# Patient Record
Sex: MALE | Race: BLACK OR AFRICAN AMERICAN | Employment: FULL TIME | URBAN - METROPOLITAN AREA
[De-identification: names, ages, dates, MRNs, and addresses within clinical notes are randomized per-mention and may not be internally consistent; named-entity substitution may affect disease eponyms.]

---

## 2017-08-09 ENCOUNTER — HOSPITAL ENCOUNTER (EMERGENCY)
Age: 37
Discharge: LEFT BEFORE DISCHARGE | End: 2017-08-09
Attending: EMERGENCY MEDICINE
Payer: COMMERCIAL

## 2017-08-09 VITALS
TEMPERATURE: 99.4 F | RESPIRATION RATE: 16 BRPM | BODY MASS INDEX: 33.05 KG/M2 | WEIGHT: 244 LBS | HEIGHT: 72 IN | HEART RATE: 95 BPM | SYSTOLIC BLOOD PRESSURE: 138 MMHG | DIASTOLIC BLOOD PRESSURE: 80 MMHG | OXYGEN SATURATION: 98 %

## 2017-08-09 PROCEDURE — 75810000275 HC EMERGENCY DEPT VISIT NO LEVEL OF CARE

## 2017-08-09 RX ORDER — ACETAMINOPHEN AND CODEINE PHOSPHATE 300; 60 MG/1; MG/1
1 TABLET ORAL
COMMUNITY

## 2017-08-09 RX ORDER — CYCLOBENZAPRINE HCL 5 MG
5 TABLET ORAL
COMMUNITY

## 2017-08-10 NOTE — ED NOTES
Pt called to triage room, pt says \"wait can't you see I'm talking to my girlfriend\". I said sir I need to triage you, after triage pt taken immediately back to room 21.

## 2017-08-10 NOTE — ED NOTES
Pt states he is leaving and going to another hospital, pt was asked to return to his room and I would get the provider to look at his leg. Pt says no I am leaving as he yelled in the lobby. Pt was educated on the importance of seeking medical attention for his left leg swelling. Lyn Owens and David FANG notified.

## 2017-08-10 NOTE — ED NOTES
Pt asked could he go and get his girlfriend out of the lobby, pt's girlfriend then ask if pt was in trouble because of the type of room he was in, I asked pt if he would like to move, pt responded by saying \"no because I won't be here long\".

## 2018-04-30 ENCOUNTER — HOSPITAL ENCOUNTER (EMERGENCY)
Age: 38
Discharge: HOME | End: 2018-04-30
Attending: EMERGENCY MEDICINE
Payer: COMMERCIAL

## 2018-04-30 ENCOUNTER — APPOINTMENT (OUTPATIENT)
Dept: GENERAL RADIOLOGY | Age: 38
End: 2018-04-30
Attending: PHYSICIAN ASSISTANT
Payer: COMMERCIAL

## 2018-04-30 VITALS
DIASTOLIC BLOOD PRESSURE: 89 MMHG | SYSTOLIC BLOOD PRESSURE: 151 MMHG | HEIGHT: 73 IN | RESPIRATION RATE: 14 BRPM | BODY MASS INDEX: 31.81 KG/M2 | TEMPERATURE: 98.5 F | WEIGHT: 240 LBS | OXYGEN SATURATION: 100 % | HEART RATE: 68 BPM

## 2018-04-30 DIAGNOSIS — S82.891A AVULSION FRACTURE OF RIGHT ANKLE, CLOSED, INITIAL ENCOUNTER: Primary | ICD-10-CM

## 2018-04-30 DIAGNOSIS — M25.561 ACUTE PAIN OF RIGHT KNEE: ICD-10-CM

## 2018-04-30 PROCEDURE — 75810000053 HC SPLINT APPLICATION

## 2018-04-30 PROCEDURE — 73610 X-RAY EXAM OF ANKLE: CPT

## 2018-04-30 PROCEDURE — 74011250637 HC RX REV CODE- 250/637: Performed by: PHYSICIAN ASSISTANT

## 2018-04-30 PROCEDURE — 73562 X-RAY EXAM OF KNEE 3: CPT

## 2018-04-30 PROCEDURE — 99283 EMERGENCY DEPT VISIT LOW MDM: CPT

## 2018-04-30 RX ORDER — NAPROXEN 375 MG/1
375 TABLET ORAL 2 TIMES DAILY WITH MEALS
Qty: 20 TAB | Refills: 0 | Status: SHIPPED | OUTPATIENT
Start: 2018-04-30

## 2018-04-30 RX ORDER — HYDROCODONE BITARTRATE AND ACETAMINOPHEN 5; 325 MG/1; MG/1
1 TABLET ORAL
Status: COMPLETED | OUTPATIENT
Start: 2018-04-30 | End: 2018-04-30

## 2018-04-30 RX ORDER — HYDROCODONE BITARTRATE AND ACETAMINOPHEN 5; 325 MG/1; MG/1
1 TABLET ORAL
Qty: 12 TAB | Refills: 0 | Status: SHIPPED | OUTPATIENT
Start: 2018-04-30

## 2018-04-30 RX ADMIN — HYDROCODONE BITARTRATE AND ACETAMINOPHEN 1 TABLET: 5; 325 TABLET ORAL at 15:43

## 2018-04-30 NOTE — ED TRIAGE NOTES
Patient states he had a work injury with his ankle that kept him out of work x2 weeks. Yesterday when he woke up he noticed that his knee was really hurting him.  His knee is throbbing

## 2018-04-30 NOTE — ED PROVIDER NOTES
EMERGENCY DEPARTMENT HISTORY AND PHYSICAL EXAM    1:19 PM      Date: 4/30/2018  Patient Name: Kaia Brambila    History of Presenting Illness     No chief complaint on file. History Provided By: Patient    Chief Complaint: Knee pain  Duration: 1 Day  Timing:  Acute  Location: Right  Quality: Throbbing  Severity: 10 out of 10  Modifying Factors: No relief with Ibuprofen  Associated Symptoms: Knee swelling    Additional History (Context): Kaia Brambila is a 42 y.o. male who presents with right 10/10 throbbing knee pain, onset 1 day ago. Pt states he sprained his ankle at work ~2 weeks ago and was released to full duty at work by [employer] at work. But he woke up yesterday morning with the knee pain. Pt states he was completely fine 2 days ago. Associated sxs include right knee swelling. Pt is using crutches because he is unable to walk with that leg. He is still having some swelling in the right ankle depending on how much walking he does. No relief with Ibuprofen. No other symptoms or concerns were expressed. PCP: None    Current Facility-Administered Medications   Medication Dose Route Frequency Provider Last Rate Last Dose    HYDROcodone-acetaminophen (NORCO) 5-325 mg per tablet 1 Tab  1 Tab Oral NOW AMPARO Amaral         Current Outpatient Prescriptions   Medication Sig Dispense Refill    HYDROcodone-acetaminophen (NORCO) 5-325 mg per tablet Take 1 Tab by mouth every four (4) hours as needed for Pain. Max Daily Amount: 6 Tabs. 12 Tab 0    naproxen (NAPROSYN) 375 mg tablet Take 1 Tab by mouth two (2) times daily (with meals). 20 Tab 0    cyclobenzaprine (FLEXERIL) 5 mg tablet Take 5 mg by mouth three (3) times daily as needed for Muscle Spasm(s).  acetaminophen-codeine (TYLENOL-CODEINE #4) 300-60 mg per tablet Take 1 Tab by mouth every four (4) hours as needed for Pain. Past History     Past Medical History:  No past medical history on file.     Past Surgical History:  No past surgical history on file. Family History:  No family history on file. Social History:  Social History   Substance Use Topics    Smoking status: Current Every Day Smoker    Smokeless tobacco: Never Used    Alcohol use Yes      Comment: social       Allergies:  No Known Allergies      Review of Systems     Review of Systems   Musculoskeletal: Positive for arthralgias (right knee) and joint swelling (right knee). Negative for back pain. Skin: Negative for color change and wound. All other systems reviewed and are negative. Physical Exam     Visit Vitals    /89 (BP 1 Location: Left arm, BP Patient Position: At rest;Sitting)    Pulse 68    Temp 98.5 °F (36.9 °C)    Resp 14    Ht 6' 1\" (1.854 m)    Wt 108.9 kg (240 lb)    SpO2 100%    BMI 31.66 kg/m2         Physical Exam   Constitutional: He appears well-developed and well-nourished. No distress. HENT:   Head: Normocephalic and atraumatic. Right Ear: Hearing and external ear normal.   Left Ear: Hearing and external ear normal.   Nose: Nose normal.   Eyes: Conjunctivae are normal.   Neck: Normal range of motion. Neck supple. Musculoskeletal:        Right knee: He exhibits decreased range of motion and swelling. He exhibits no ecchymosis, no deformity, no erythema, normal alignment, normal patellar mobility and no bony tenderness. Tenderness found. Lateral joint line tenderness noted. Right ankle: He exhibits swelling (minimal ). He exhibits normal range of motion, no deformity, no laceration and normal pulse. No tenderness. Achilles tendon normal.   Right LE: popliteal, DP, PT pulses intact   Neurological: He is alert. Walks with crutches   Skin: Skin is warm and dry. He is not diaphoretic. Psychiatric: He has a normal mood and affect. Nursing note and vitals reviewed. Diagnostic Study Results     Labs -  No results found for this or any previous visit (from the past 12 hour(s)).     Radiologic Studies -   XR KNEE RT 3 V    (Results Pending)   XR ANKLE RT MIN 3 V    (Results Pending)         Medical Decision Making   I am the first provider for this patient. I reviewed the vital signs, available nursing notes, past medical history, past surgical history, family history and social history. Vital Signs-Reviewed the patient's vital signs. Pulse Oximetry Analysis -  100% on room air     Records Reviewed: Nursing Notes (Time of Review: 1:19 PM)    Provider Notes (Medical Decision Making): Pt c/o right knee pain x Saturday. Had ankle sprain 2 weeks ago which occurred at work, dx at [employer] and was cleared to return. Re-xrayed ankle here and noted avulsion fx x2 (which pt states he was told as well, but was not splinted). Knee w/o acute process. Will splint in ocl and have pt f/u with ortho. He has crutches. Recommend RICE at home, nsaids, norco for break through pain. AMPARO Amaral 3:21 PM        Diagnosis     Clinical Impression:   1. Avulsion fracture of right ankle, closed, initial encounter    2. Acute pain of right knee        Disposition: discharge    Follow-up Information     Follow up With Details Comments Teresa. MD Shai Cifuentes [phone] [phone]             Patient's Medications   Start Taking    HYDROCODONE-ACETAMINOPHEN (NORCO) 5-325 MG PER TABLET    Take 1 Tab by mouth every four (4) hours as needed for Pain. Max Daily Amount: 6 Tabs. NAPROXEN (NAPROSYN) 375 MG TABLET    Take 1 Tab by mouth two (2) times daily (with meals). Continue Taking    ACETAMINOPHEN-CODEINE (TYLENOL-CODEINE #4) 300-60 MG PER TABLET    Take 1 Tab by mouth every four (4) hours as needed for Pain. CYCLOBENZAPRINE (FLEXERIL) 5 MG TABLET    Take 5 mg by mouth three (3) times daily as needed for Muscle Spasm(s).    These Medications have changed    No medications on file   Stop Taking    No medications on file _______________________________    Attestations:  AMPARO Giraldo acting as a scribe for and in the presence of Kojo Ferguson PA-C     April 30, 2018 at [facility] PM       Provider Attestation:      I personally performed the services described in the documentation, reviewed the documentation, as recorded by the scribe in my presence, and it accurately and completely records my words and actions.  April 30, 2018 at 3:21 PM - Kojo Ferguson PA-C     _______________________________

## 2018-04-30 NOTE — LETTER
[address] [place] [address] Dr [facility] EMERGENCY DEPT 
[address] [facility] [address] 
[phone] Work/School Note Date: 4/30/2018 To Whom It May concern: 
 
Erin Hess was seen and treated today in the emergency room by the following provider(s): 
Attending Provider: Cassy Dubon MD 
Physician Assistant: AMPARO Veronica. Erin Hess may not return to work until cleared by orthopedist.  
 
Sincerely, AMPARO Veronica

## 2018-06-26 ENCOUNTER — APPOINTMENT (OUTPATIENT)
Dept: CT IMAGING | Age: 38
End: 2018-06-26
Attending: EMERGENCY MEDICINE
Payer: COMMERCIAL

## 2018-06-26 ENCOUNTER — HOSPITAL ENCOUNTER (EMERGENCY)
Age: 38
Discharge: HOME | End: 2018-06-27
Attending: EMERGENCY MEDICINE
Payer: COMMERCIAL

## 2018-06-26 DIAGNOSIS — M54.16 LUMBAR RADICULOPATHY, RIGHT: ICD-10-CM

## 2018-06-26 DIAGNOSIS — R03.0 ELEVATED BLOOD PRESSURE READING: ICD-10-CM

## 2018-06-26 DIAGNOSIS — M54.41 ACUTE RIGHT-SIDED LOW BACK PAIN WITH RIGHT-SIDED SCIATICA: Primary | ICD-10-CM

## 2018-06-26 PROCEDURE — 74011250637 HC RX REV CODE- 250/637: Performed by: EMERGENCY MEDICINE

## 2018-06-26 PROCEDURE — 72131 CT LUMBAR SPINE W/O DYE: CPT

## 2018-06-26 PROCEDURE — 99285 EMERGENCY DEPT VISIT HI MDM: CPT

## 2018-06-26 RX ORDER — OXYCODONE AND ACETAMINOPHEN 5; 325 MG/1; MG/1
1 TABLET ORAL
Status: COMPLETED | OUTPATIENT
Start: 2018-06-26 | End: 2018-06-26

## 2018-06-26 RX ORDER — DIAZEPAM 5 MG/1
5 TABLET ORAL
Status: COMPLETED | OUTPATIENT
Start: 2018-06-26 | End: 2018-06-26

## 2018-06-26 RX ORDER — IBUPROFEN 400 MG/1
800 TABLET ORAL
Status: DISCONTINUED | OUTPATIENT
Start: 2018-06-26 | End: 2018-06-27 | Stop reason: HOSPADM

## 2018-06-26 RX ADMIN — OXYCODONE HYDROCHLORIDE AND ACETAMINOPHEN 1 TABLET: 5; 325 TABLET ORAL at 23:56

## 2018-06-26 RX ADMIN — DIAZEPAM 5 MG: 5 TABLET ORAL at 23:56

## 2018-06-27 ENCOUNTER — APPOINTMENT (OUTPATIENT)
Dept: MRI IMAGING | Age: 38
End: 2018-06-27
Attending: EMERGENCY MEDICINE
Payer: COMMERCIAL

## 2018-06-27 VITALS
OXYGEN SATURATION: 97 % | RESPIRATION RATE: 18 BRPM | WEIGHT: 235 LBS | HEART RATE: 79 BPM | HEIGHT: 73 IN | BODY MASS INDEX: 31.14 KG/M2 | DIASTOLIC BLOOD PRESSURE: 83 MMHG | SYSTOLIC BLOOD PRESSURE: 132 MMHG | TEMPERATURE: 98.4 F

## 2018-06-27 LAB
ANION GAP SERPL CALC-SCNC: 5 MMOL/L (ref 3–18)
APTT PPP: 29.4 SEC (ref 23–36.4)
BASOPHILS # BLD: 0 K/UL (ref 0–0.1)
BASOPHILS NFR BLD: 0 % (ref 0–2)
BUN SERPL-MCNC: 13 MG/DL (ref 7–18)
BUN/CREAT SERPL: 10 (ref 12–20)
CALCIUM SERPL-MCNC: 9.1 MG/DL (ref 8.5–10.1)
CHLORIDE SERPL-SCNC: 105 MMOL/L (ref 100–108)
CO2 SERPL-SCNC: 29 MMOL/L (ref 21–32)
CREAT SERPL-MCNC: 1.32 MG/DL (ref 0.6–1.3)
DIFFERENTIAL METHOD BLD: ABNORMAL
EOSINOPHIL # BLD: 0.6 K/UL (ref 0–0.4)
EOSINOPHIL NFR BLD: 9 % (ref 0–5)
ERYTHROCYTE [DISTWIDTH] IN BLOOD BY AUTOMATED COUNT: 14.1 % (ref 11.6–14.5)
GLUCOSE SERPL-MCNC: 103 MG/DL (ref 74–99)
HCT VFR BLD AUTO: 47.6 % (ref 36–48)
HGB BLD-MCNC: 17 G/DL (ref 13–16)
INR PPP: 0.9 (ref 0.8–1.2)
LYMPHOCYTES # BLD: 2.3 K/UL (ref 0.9–3.6)
LYMPHOCYTES NFR BLD: 35 % (ref 21–52)
MCH RBC QN AUTO: 31.7 PG (ref 24–34)
MCHC RBC AUTO-ENTMCNC: 35.7 G/DL (ref 31–37)
MCV RBC AUTO: 88.6 FL (ref 74–97)
MONOCYTES # BLD: 0.4 K/UL (ref 0.05–1.2)
MONOCYTES NFR BLD: 7 % (ref 3–10)
NEUTS SEG # BLD: 3.2 K/UL (ref 1.8–8)
NEUTS SEG NFR BLD: 49 % (ref 40–73)
PLATELET # BLD AUTO: 139 K/UL (ref 135–420)
PMV BLD AUTO: 11.8 FL (ref 9.2–11.8)
POTASSIUM SERPL-SCNC: 3.8 MMOL/L (ref 3.5–5.5)
PROTHROMBIN TIME: 12.1 SEC (ref 11.5–15.2)
RBC # BLD AUTO: 5.37 M/UL (ref 4.7–5.5)
SODIUM SERPL-SCNC: 139 MMOL/L (ref 136–145)
WBC # BLD AUTO: 6.5 K/UL (ref 4.6–13.2)

## 2018-06-27 PROCEDURE — 74011250637 HC RX REV CODE- 250/637: Performed by: EMERGENCY MEDICINE

## 2018-06-27 PROCEDURE — 80048 BASIC METABOLIC PNL TOTAL CA: CPT | Performed by: EMERGENCY MEDICINE

## 2018-06-27 PROCEDURE — 85730 THROMBOPLASTIN TIME PARTIAL: CPT | Performed by: EMERGENCY MEDICINE

## 2018-06-27 PROCEDURE — 93005 ELECTROCARDIOGRAM TRACING: CPT

## 2018-06-27 PROCEDURE — 96374 THER/PROPH/DIAG INJ IV PUSH: CPT

## 2018-06-27 PROCEDURE — 74011250636 HC RX REV CODE- 250/636: Performed by: EMERGENCY MEDICINE

## 2018-06-27 PROCEDURE — 85025 COMPLETE CBC W/AUTO DIFF WBC: CPT | Performed by: EMERGENCY MEDICINE

## 2018-06-27 PROCEDURE — 72158 MRI LUMBAR SPINE W/O & W/DYE: CPT

## 2018-06-27 PROCEDURE — 85610 PROTHROMBIN TIME: CPT | Performed by: EMERGENCY MEDICINE

## 2018-06-27 PROCEDURE — A9577 INJ MULTIHANCE: HCPCS | Performed by: EMERGENCY MEDICINE

## 2018-06-27 RX ORDER — DEXAMETHASONE SODIUM PHOSPHATE 4 MG/ML
10 INJECTION, SOLUTION INTRA-ARTICULAR; INTRALESIONAL; INTRAMUSCULAR; INTRAVENOUS; SOFT TISSUE
Status: COMPLETED | OUTPATIENT
Start: 2018-06-27 | End: 2018-06-27

## 2018-06-27 RX ORDER — OXYCODONE AND ACETAMINOPHEN 5; 325 MG/1; MG/1
1 TABLET ORAL
Status: COMPLETED | OUTPATIENT
Start: 2018-06-27 | End: 2018-06-27

## 2018-06-27 RX ORDER — TRAMADOL HYDROCHLORIDE 50 MG/1
50 TABLET ORAL
Qty: 14 TAB | Refills: 0 | Status: SHIPPED | OUTPATIENT
Start: 2018-06-27

## 2018-06-27 RX ORDER — DEXAMETHASONE SODIUM PHOSPHATE 4 MG/ML
10 INJECTION, SOLUTION INTRA-ARTICULAR; INTRALESIONAL; INTRAMUSCULAR; INTRAVENOUS; SOFT TISSUE
Status: DISCONTINUED | OUTPATIENT
Start: 2018-06-27 | End: 2018-06-27

## 2018-06-27 RX ORDER — DIAZEPAM 5 MG/1
5 TABLET ORAL
Status: COMPLETED | OUTPATIENT
Start: 2018-06-27 | End: 2018-06-27

## 2018-06-27 RX ORDER — DEXAMETHASONE 2 MG/1
TABLET ORAL
Qty: 10 TAB | Refills: 0 | Status: SHIPPED | OUTPATIENT
Start: 2018-06-27

## 2018-06-27 RX ORDER — METHYLPREDNISOLONE 4 MG/1
TABLET ORAL
Qty: 1 DOSE PACK | Refills: 0 | Status: SHIPPED | OUTPATIENT
Start: 2018-06-27 | End: 2018-07-09

## 2018-06-27 RX ADMIN — GADOBENATE DIMEGLUMINE 10 ML: 529 INJECTION, SOLUTION INTRAVENOUS at 05:59

## 2018-06-27 RX ADMIN — OXYCODONE HYDROCHLORIDE AND ACETAMINOPHEN 1 TABLET: 5; 325 TABLET ORAL at 04:49

## 2018-06-27 RX ADMIN — OXYCODONE HYDROCHLORIDE AND ACETAMINOPHEN 1 TABLET: 5; 325 TABLET ORAL at 05:50

## 2018-06-27 RX ADMIN — GADOBENATE DIMEGLUMINE 10 ML: 529 INJECTION, SOLUTION INTRAVENOUS at 06:00

## 2018-06-27 RX ADMIN — DIAZEPAM 5 MG: 5 TABLET ORAL at 04:49

## 2018-06-27 RX ADMIN — DEXAMETHASONE SODIUM PHOSPHATE 10 MG: 4 INJECTION, SOLUTION INTRAMUSCULAR; INTRAVENOUS at 02:41

## 2018-06-27 NOTE — ED NOTES
4:21 AM: Patient care assumed from [address], ED provider. Patient complaint(s), current treatment plan, progression, and available diagnostic results have been discussed thoroughly. Rounding occurred: yes  Intended Disposition: TBD   Pending diagnostic reports and/or labs (please list): MRI L-Spine, Recommendations per Dr. Acuna Kos:  Recent Results (from the past 12 hour(s))   EKG, 12 LEAD, INITIAL    Collection Time: 06/27/18  2:21 AM   Result Value Ref Range    Ventricular Rate 64 BPM    Atrial Rate 64 BPM    P-R Interval 164 ms    QRS Duration 86 ms    Q-T Interval 418 ms    QTC Calculation (Bezet) 431 ms    Calculated P Axis 66 degrees    Calculated R Axis 42 degrees    Calculated T Axis 19 degrees    Diagnosis       Normal sinus rhythm  Possible Left atrial enlargement  Borderline ECG  No previous ECGs available     CBC WITH AUTOMATED DIFF    Collection Time: 06/27/18  2:30 AM   Result Value Ref Range    WBC 6.5 4.6 - 13.2 K/uL    RBC 5.37 4.70 - 5.50 M/uL    HGB 17.0 (H) 13.0 - 16.0 g/dL    HCT 47.6 36.0 - 48.0 %    MCV 88.6 74.0 - 97.0 FL    MCH 31.7 24.0 - 34.0 PG    MCHC 35.7 31.0 - 37.0 g/dL    RDW 14.1 11.6 - 14.5 %    PLATELET 149 116 - 162 K/uL    MPV 11.8 9.2 - 11.8 FL    NEUTROPHILS 49 40 - 73 %    LYMPHOCYTES 35 21 - 52 %    MONOCYTES 7 3 - 10 %    EOSINOPHILS 9 (H) 0 - 5 %    BASOPHILS 0 0 - 2 %    ABS. NEUTROPHILS 3.2 1.8 - 8.0 K/UL    ABS. LYMPHOCYTES 2.3 0.9 - 3.6 K/UL    ABS. MONOCYTES 0.4 0.05 - 1.2 K/UL    ABS. EOSINOPHILS 0.6 (H) 0.0 - 0.4 K/UL    ABS.  BASOPHILS 0.0 0.0 - 0.1 K/UL    DF AUTOMATED     METABOLIC PANEL, BASIC    Collection Time: 06/27/18  2:30 AM   Result Value Ref Range    Sodium 139 136 - 145 mmol/L    Potassium 3.8 3.5 - 5.5 mmol/L    Chloride 105 100 - 108 mmol/L    CO2 29 21 - 32 mmol/L    Anion gap 5 3.0 - 18 mmol/L    Glucose 103 (H) 74 - 99 mg/dL    BUN 13 7.0 - 18 MG/DL    Creatinine 1.32 (H) 0.6 - 1.3 MG/DL    BUN/Creatinine ratio 10 (L) 12 - 20      GFR est AA >60 >60 ml/min/1.73m2    GFR est non-AA >60 >60 ml/min/1.73m2    Calcium 9.1 8.5 - 10.1 MG/DL   PTT    Collection Time: 06/27/18  2:30 AM   Result Value Ref Range    aPTT 29.4 23.0 - 36.4 SEC   PROTHROMBIN TIME + INR    Collection Time: 06/27/18  2:30 AM   Result Value Ref Range    Prothrombin time 12.1 11.5 - 15.2 sec    INR 0.9 0.8 - 1.2         Imaging:  MRI LUMB SPINE W WO CONT   Final Result      CT SPINE LUMB WO CONT   Final Result        Radiologist's interpretation of MRI L-Spine (Read by Dr. Kaitlynn Maddox):  1. Disc extrusion at L4-5. The extruded disc is in position to contact and  displace the emerging right L5 nerve root. 2.  No abnormal enhancement. Disposition:   6:57 AM : Pt care transferred to Dr. Geovany Stanley, ED provider. History of patient complaint(s), available diagnostic reports and current treatment plan has been discussed thoroughly. Bedside rounding on patient occured : yes . Intended disposition of patient : TBD  Pending diagnostics reports and/or labs (please list): Recommendations per Dr. Yuko Lowry up With Details Comments Dinorah Alvares MD Schedule an appointment as soon as possible for a visit today  [facility] [address]  [address] [place] [address] [facility] EMERGENCY DEPT  If symptoms worsen return immediately [address] [place] [id number]  [phone]           Patient's Medications   Start Taking    DEXAMETHASONE (DECADRON) 2 MG TABLET    Take one tablet twice daily x 5 days   Continue Taking    ACETAMINOPHEN-CODEINE (TYLENOL-CODEINE #4) 300-60 MG PER TABLET    Take 1 Tab by mouth every four (4) hours as needed for Pain. CYCLOBENZAPRINE (FLEXERIL) 5 MG TABLET    Take 5 mg by mouth three (3) times daily as needed for Muscle Spasm(s). HYDROCODONE-ACETAMINOPHEN (NORCO) 5-325 MG PER TABLET    Take 1 Tab by mouth every four (4) hours as needed for Pain. Max Daily Amount: 6 Tabs.     NAPROXEN (NAPROSYN) 375 MG TABLET    Take 1 Tab by mouth two (2) times daily (with meals). These Medications have changed    No medications on file   Stop Taking    No medications on file         Scribe Attestation:     Janessa De La Cruz, acting as a scribe for and in the presence of Andrea Gomez DO      June 27, 2018 at 4:21 AM       Provider Attestation:      I personally performed the services described in the documentation, reviewed the documentation, as recorded by the scribe in my presence, and it accurately and completely records my words and actions.  June 27, 2018 at DO Deshawn

## 2018-06-27 NOTE — ED TRIAGE NOTES
Patient arrived to the ED complaining of back pain that is radiating down the leg. Patient states he was given gabapentin and prednisone for the back pain 2 weeks ago, but it has not improved. Patient states he has taken motrin, vicodin, and gabapentin this evening without relief. Patient states that he is having difficulty standing. Patient states he recently had an ankle injury at work and states he thinks he may have tweaked his back at therapy for his ankle.

## 2018-06-27 NOTE — DISCHARGE INSTRUCTIONS
Elevated Blood Pressure: Care Instructions  Your Care Instructions    Blood pressure is a measure of how hard the blood pushes against the walls of your arteries. It's normal for blood pressure to go up and down throughout the day. But if it stays up over time, you have high blood pressure. Two numbers tell you your blood pressure. The first number is the systolic pressure. It shows how hard the blood pushes when your heart is pumping. The second number is the diastolic pressure. It shows how hard the blood pushes between heartbeats, when your heart is relaxed and filling with blood. An ideal blood pressure in adults is less than 120/80 (say \"120 over 80\"). High blood pressure is 140/90 or higher. You have high blood pressure if your top number is 140 or higher or your bottom number is 90 or higher, or both. The main test for high blood pressure is simple, fast, and painless. To diagnose high blood pressure, your doctor will test your blood pressure at different times. After testing your blood pressure, your doctor may ask you to test it again when you are home. If you are diagnosed with high blood pressure, you can work with your doctor to make a long-term plan to manage it. Follow-up care is a key part of your treatment and safety. Be sure to make and go to all appointments, and call your doctor if you are having problems. It's also a good idea to know your test results and keep a list of the medicines you take. How can you care for yourself at home? · Do not smoke. Smoking increases your risk for heart attack and stroke. If you need help quitting, talk to your doctor about stop-smoking programs and medicines. These can increase your chances of quitting for good. · Stay at a healthy weight. · Try to limit how much sodium you eat to less than 2,300 milligrams (mg) a day. Your doctor may ask you to try to eat less than 1,500 mg a day. · Be physically active.  Get at least 30 minutes of exercise on most days of the week. Walking is a good choice. You also may want to do other activities, such as running, swimming, cycling, or playing tennis or team sports. · Avoid or limit alcohol. Talk to your doctor about whether you can drink any alcohol. · Eat plenty of fruits, vegetables, and low-fat dairy products. Eat less saturated and total fats. · Learn how to check your blood pressure at home. When should you call for help? Call your doctor now or seek immediate medical care if:  ? · Your blood pressure is much higher than normal (such as 180/110 or higher). ? · You think high blood pressure is causing symptoms such as:  ¨ Severe headache. ¨ Blurry vision. ? Watch closely for changes in your health, and be sure to contact your doctor if:  ? · You do not get better as expected. Where can you learn more? Go to http://[employer] Labsm[employer]te.info/. Enter N820 in the search box to learn more about \"Elevated Blood Pressure: Care Instructions. \"  Current as of: September 21, 2016  Content Version: 11.4  © 1678-2293 [employer]. Care instructions adapted under license by [employer] (which disclaims liability or warranty for this information). If you have questions about a medical condition or this instruction, always ask your healthcare professional. [address] any warranty or liability for your use of this information. Learning About Relief for Back Pain  What is back tension and strain? Back strain happens when you overstretch, or pull, a muscle in your back. You may hurt your back in an accident or when you exercise or lift something. Most back pain will get better with rest and time. You can take care of yourself at home to help your back heal.  What can you do first to relieve back pain? When you first feel back pain, try these steps:  · Walk.  Take a short walk (10 to 20 minutes) on a level surface (no slopes, hills, or stairs) every 2 to 3 hours. Walk only distances you can manage without pain, especially leg pain. · Relax. Find a comfortable position for rest. Some people are comfortable on the floor or a medium-firm bed with a small pillow under their head and another under their knees. Some people prefer to lie on their side with a pillow between their knees. Don't stay in one position for too long. · Try heat or ice. Try using a heating pad on a low or medium setting, or take a warm shower, for 15 to 20 minutes every 2 to 3 hours. Or you can buy single-use heat wraps that last up to 8 hours. You can also try an ice pack for 10 to 15 minutes every 2 to 3 hours. You can use an ice pack or a bag of frozen vegetables wrapped in a thin towel. There is not strong evidence that either heat or ice will help, but you can try them to see if they help. You may also want to try switching between heat and cold. · Take pain medicine exactly as directed. ¨ If the doctor gave you a prescription medicine for pain, take it as prescribed. ¨ If you are not taking a prescription pain medicine, ask your doctor if you can take an over-the-counter medicine. What else can you do? · Stretch and exercise. Exercises that increase flexibility may relieve your pain and make it easier for your muscles to keep your spine in a good, neutral position. And don't forget to keep walking. · Do self-massage. You can use self-massage to unwind after work or school or to energize yourself in the morning. You can easily massage your feet, hands, or neck. Self-massage works best if you are in comfortable clothes and are sitting or lying in a comfortable position. Use oil or lotion to massage bare skin. · Reduce stress. Back pain can lead to a vicious [facility]: Distress about the pain tenses the muscles in your back, which in turn causes more pain. Learn how to relax your mind and your muscles to lower your stress. Where can you learn more?   Go to http://renan-mayte.info/. Enter C515 in the search box to learn more about \"Learning About Relief for Back Pain. \"  Current as of: March 21, 2017  Content Version: 11.5  © 5152-3122 [employer]. Care instructions adapted under license by [employer] (which disclaims liability or warranty for this information). If you have questions about a medical condition or this instruction, always ask your healthcare professional. [place] [address] any warranty or liability for your use of this information.

## 2018-06-27 NOTE — ED PROVIDER NOTES
EMERGENCY DEPARTMENT HISTORY AND PHYSICAL EXAM    Date: 6/26/2018  Patient Name: Lord Brooks    History of Presenting Illness     Chief Complaint   Patient presents with    Back Pain         History Provided By: Patient and maximus    Chief Complaint: back pain  Duration: several Months  Timing:  Acute, Constant and Worsening  Location: right  Quality: Aching and Burning  Severity: Moderate  Modifying Factors: fall -- went to PT for ankle fx and had back pain then, too  Associated Symptoms: denies any other associated signs or symptoms      Additional History (Context): Lord Brooks is a 42 y.o. male with No significant past medical history who presents with low back pain radiating to right leg; has had pain for a while now. 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 During PT for the ankle, he also had low back pain. returned to work on 6/7/18. Went to [facility] for his back (separate from the ortho group treating his ankle) and told by provider his insurance wouldn't cover the MRI until two weeks of PT completed first.  Pt hasn't had the PT yet but is to start on 6/26/18. On prednisone, gabapentin from ortho; mom has had old Vicodin and is taking those. Denies saddle anesthesia, bowel incontinence, fever, rash, additional trauma, unintentional weight loss in the past six months, rectal pain, IVD use. PCP: None    Current Facility-Administered Medications   Medication Dose Route Frequency Provider Last Rate Last Dose    ibuprofen (MOTRIN) tablet 800 mg  800 mg Oral NOW Giovanni Enriques, PA   Stopped at 06/26/18 3247     Current Outpatient Prescriptions   Medication Sig Dispense Refill    dexamethasone (DECADRON) 2 mg tablet Take one tablet twice daily x 5 days 10 Tab 0    HYDROcodone-acetaminophen (NORCO) 5-325 mg per tablet Take 1 Tab by mouth every four (4) hours as needed for Pain.  Max Daily Amount: 6 Tabs. 12 Tab 0    naproxen (NAPROSYN) 375 mg tablet Take 1 Tab by mouth two (2) times daily (with meals). 20 Tab 0    cyclobenzaprine (FLEXERIL) 5 mg tablet Take 5 mg by mouth three (3) times daily as needed for Muscle Spasm(s).  acetaminophen-codeine (TYLENOL-CODEINE #4) 300-60 mg per tablet Take 1 Tab by mouth every four (4) hours as needed for Pain. Past History     Past Medical History:  History reviewed. No pertinent past medical history. Past Surgical History:  History reviewed. No pertinent surgical history. Family History:  History reviewed. No pertinent family history. Social History:  Social History   Substance Use Topics    Smoking status: Current Every Day Smoker    Smokeless tobacco: Never Used    Alcohol use Yes      Comment: social       Allergies:  No Known Allergies      Review of Systems   Review of Systems   Constitutional: Negative for fever. Musculoskeletal: Positive for back pain. Skin: Negative for rash and wound. Neurological: Negative for weakness and numbness. All other systems reviewed and are negative. All Other Systems Negative  Physical Exam     Vitals:    06/26/18 2251 06/27/18 0015   BP: 134/86 (!) 158/92   Pulse: 79    Resp: 18    Temp: 98.4 °F (36.9 °C)    SpO2: 99% 99%     Physical Exam   Constitutional: Vital signs are normal. He appears well-developed and well-nourished. He is active. Non-toxic appearance. He does not appear ill. No distress. HENT:   Head: Normocephalic and atraumatic. Neck: Normal range of motion. Neck supple. Carotid bruit is not present. No tracheal deviation present. No thyromegaly present. Cardiovascular: Normal rate, regular rhythm and normal heart sounds. Exam reveals no gallop and no friction rub. No murmur heard. Pulmonary/Chest: Effort normal and breath sounds normal. No stridor. No respiratory distress. He has no wheezes. He has no rales. He exhibits no tenderness. Abdominal: Soft.  He exhibits no distension and no mass. There is no tenderness. There is no rebound, no guarding and no CVA tenderness. Musculoskeletal: Normal range of motion. He exhibits tenderness. Right SI jt TTP; +SLE and ETHEL. Neurological: He is alert. Skin: Skin is warm, dry and intact. He is not diaphoretic. No pallor. Psychiatric: He has a normal mood and affect. His speech is normal and behavior is normal. Judgment and thought content normal.   Nursing note and vitals reviewed. Diagnostic Study Results     Labs -     Recent Results (from the past 12 hour(s))   EKG, 12 LEAD, INITIAL    Collection Time: 06/27/18  2:21 AM   Result Value Ref Range    Ventricular Rate 64 BPM    Atrial Rate 64 BPM    P-R Interval 164 ms    QRS Duration 86 ms    Q-T Interval 418 ms    QTC Calculation (Bezet) 431 ms    Calculated P Axis 66 degrees    Calculated R Axis 42 degrees    Calculated T Axis 19 degrees    Diagnosis       Normal sinus rhythm  Possible Left atrial enlargement  Borderline ECG  No previous ECGs available     CBC WITH AUTOMATED DIFF    Collection Time: 06/27/18  2:30 AM   Result Value Ref Range    WBC 6.5 4.6 - 13.2 K/uL    RBC 5.37 4.70 - 5.50 M/uL    HGB 17.0 (H) 13.0 - 16.0 g/dL    HCT 47.6 36.0 - 48.0 %    MCV 88.6 74.0 - 97.0 FL    MCH 31.7 24.0 - 34.0 PG    MCHC 35.7 31.0 - 37.0 g/dL    RDW 14.1 11.6 - 14.5 %    PLATELET 724 028 - 405 K/uL    MPV 11.8 9.2 - 11.8 FL    NEUTROPHILS 49 40 - 73 %    LYMPHOCYTES 35 21 - 52 %    MONOCYTES 7 3 - 10 %    EOSINOPHILS 9 (H) 0 - 5 %    BASOPHILS 0 0 - 2 %    ABS. NEUTROPHILS 3.2 1.8 - 8.0 K/UL    ABS. LYMPHOCYTES 2.3 0.9 - 3.6 K/UL    ABS. MONOCYTES 0.4 0.05 - 1.2 K/UL    ABS. EOSINOPHILS 0.6 (H) 0.0 - 0.4 K/UL    ABS.  BASOPHILS 0.0 0.0 - 0.1 K/UL    DF AUTOMATED     METABOLIC PANEL, BASIC    Collection Time: 06/27/18  2:30 AM   Result Value Ref Range    Sodium 139 136 - 145 mmol/L    Potassium 3.8 3.5 - 5.5 mmol/L    Chloride 105 100 - 108 mmol/L    CO2 29 21 - 32 mmol/L Anion gap 5 3.0 - 18 mmol/L    Glucose 103 (H) 74 - 99 mg/dL    BUN 13 7.0 - 18 MG/DL    Creatinine 1.32 (H) 0.6 - 1.3 MG/DL    BUN/Creatinine ratio 10 (L) 12 - 20      GFR est AA >60 >60 ml/min/1.73m2    GFR est non-AA >60 >60 ml/min/1.73m2    Calcium 9.1 8.5 - 10.1 MG/DL   PTT    Collection Time: 06/27/18  2:30 AM   Result Value Ref Range    aPTT 29.4 23.0 - 36.4 SEC   PROTHROMBIN TIME + INR    Collection Time: 06/27/18  2:30 AM   Result Value Ref Range    Prothrombin time 12.1 11.5 - 15.2 sec    INR 0.9 0.8 - 1.2         Radiologic Studies -   CT SPINE LUMB WO CONT   Final Result      MRI LUMB SPINE W WO CONT    (Results Pending)     CT Results  (Last 48 hours)               06/27/18 0108  CT SPINE LUMB WO CONT Final result    Impression:  IMPRESSION:       Right paramedian disc extrusion at L4-5 resulting in central canal narrowing and   probable contact/ displacement of the emerging right L5 nerve root. Narrative:  PROCEDURE:  CT Lumbar Spine without Contrast.       INDICATION:  Back pain radiating down the right leg. Recent twisting of ankle   which may have exacerbated pre-existing back pain. Medical treatment with   Motrin, Vicodin, gabapentin and prednisone without relief. COMPARISON:  None. TECHNIQUE:  Helical volumetric CT imaging of the lumbar spine is performed   without intravenous or intrathecal contrast.  From this volumetric dataset,   multiplanar reconstruction images are generated. - Radiation dose:  1228 mGy-cm.   - Note:  Radiation dose optimization techniques are utilized as appropriate to   the exam, with combination of automated exposure control, adjustment of the mA   and/or kV according to patient's size (Including appropriate matching for   site-specific examinations), or use of iterative reconstruction technique. FINDINGS:       On a technical note, current scan is performed with the patient lying on the   right side due to the patient's discomfort. No evidence of acute fracture is detected. The normally lordotic curvature of   the lumbar spine is replaced with slightly kyphotic curvature, presumably   related to the patient's positioning. There is minimal retrolisthesis of L5   with respect to S1 by about 4 mm. Again this may be exacerbated by the patient   positioning. L1-2, L2-3:  Unremarkable. L3-4:  Minimal disc bulge. Mild ligamentum flavum hypertrophy. No definite   facet hypertrophy. No definite central canal narrowing or foraminal narrowing. L4-5:  Right paramedian disc extrusion (sagittal #30, axial #73). The extruded   disc measures about 1.6 cm in craniocaudal length, 1.2 cm across the base and   0.8 cm in thickness. The extruded disc may be in contact with and displace the   emerging right L5 nerve root. Central canal narrowing secondary to the bulk of   the extruded disc. No evidence for significant foraminal narrowing on either   side. No definite facet arthropathy. Mild ligamentum flavum hypertrophy. L5-S1:  Moderate disc bulge. No central canal narrowing. No significant   foraminal narrowing. Mild facet hypertrophy. Mild ligamentum flavum   hypertrophy. CXR Results  (Last 48 hours)    None            Medical Decision Making   I am the first provider for this patient. I reviewed the vital signs, available nursing notes, past medical history, past surgical history, family history and social history. Vital Signs-Reviewed the patient's vital signs. Records Reviewed: Nursing Notes, Old Medical Records and Previous Radiology Studies    Procedures:  EKG  Date/Time: 6/27/2018 4:09 AM  Performed by: Rodrigo Childs  Authorized by: Rodrigo Childs           Provider Notes (Medical Decision Making): spoke with Dr. Rubio De La Garza re: CT results. Was discussed w/ED attending, will obtain MRI now, hold in ED until evaluated and examined by Rubio De La Garza this morning.   Pt is a good candidate for close outpatient f/up as he's already demonstrated 8 weeks of PT.  Pre-op labs obtained. Pending MRI results and pt's pain control later this morning, pt also could be admitted for surgery either today or tomorrow. 4:08 AM : Pt care transferred to Dr. Nader Mccrary  ,ED provider. History of patient complaint(s), available diagnostic reports and current treatment plan has been discussed thoroughly. Bedside rounding on patient occured : no . Intended disposition of patient : TBD  Pending diagnostics reports and/or labs (please list): MRI, labs      MED RECONCILIATION:  Current Facility-Administered Medications   Medication Dose Route Frequency    ibuprofen (MOTRIN) tablet 800 mg  800 mg Oral NOW     Current Outpatient Prescriptions   Medication Sig    dexamethasone (DECADRON) 2 mg tablet Take one tablet twice daily x 5 days    HYDROcodone-acetaminophen (NORCO) 5-325 mg per tablet Take 1 Tab by mouth every four (4) hours as needed for Pain. Max Daily Amount: 6 Tabs.  naproxen (NAPROSYN) 375 mg tablet Take 1 Tab by mouth two (2) times daily (with meals).  cyclobenzaprine (FLEXERIL) 5 mg tablet Take 5 mg by mouth three (3) times daily as needed for Muscle Spasm(s).  acetaminophen-codeine (TYLENOL-CODEINE #4) 300-60 mg per tablet Take 1 Tab by mouth every four (4) hours as needed for Pain. Disposition:  TBD      Follow-up Information     Follow up With Details Comments Contact Juaquin Ch MD Schedule an appointment as soon as possible for a visit today  David Parra [address]  [address] [facility] EM[facility]ENC[facility] DEPT  If symptoms worsen return immediately [address]  [phone]          Current Discharge Medication List      START taking these medications    Details   dexamethasone (DECADRON) 2 mg tablet Take one tablet twice daily x 5 days  Qty: 10 Tab, Refills: 0                 Diagnosis     Clinical Impression:   1.  Acute right-sided low back pain with right-sided sciatica    2.  Lumbar radiculopathy, right    3. Elevated blood pressure reading

## 2018-06-27 NOTE — ED NOTES
Pt in ED sitting in wheelchair, pt reports right knee pain and back pain, pt reports 8 weeks of workmans compensation and physical therapy pt states went back to work with new ankle pain pt reports taking gabapentin, prednisone with no relief.

## 2018-06-27 NOTE — ED NOTES
8:11 AM Patient was evaluated by Dr. Thaddeus Edge at bedside. He reviewed the images and reports these are chronic and the patient follows an ortho/spine physician in [place], Dr. Ede Leslie. Dr. Thaddeus Edge recommends increases the gabapentin, a Medrol dose pack and follow up with his orthopedist or with Dr. Thaddeus Edge for outpatient. Scribe Attestation     [facility] acting as a scribe for and in the presence of Dr. Colton Tripp MD      June 27, 2018 at 8:16 AM       Provider Attestation:      I personally performed the services described in the documentation, reviewed the documentation, as recorded by the scribe in my presence, and it accurately and completely records my words and actions.  June 27, 2018 at 8:16 AM - Dr. Colton Tripp MD

## 2018-06-28 LAB
ATRIAL RATE: 64 BPM
CALCULATED P AXIS, ECG09: 66 DEGREES
CALCULATED R AXIS, ECG10: 42 DEGREES
CALCULATED T AXIS, ECG11: 19 DEGREES
DIAGNOSIS, 93000: NORMAL
P-R INTERVAL, ECG05: 164 MS
Q-T INTERVAL, ECG07: 418 MS
QRS DURATION, ECG06: 86 MS
QTC CALCULATION (BEZET), ECG08: 431 MS
VENTRICULAR RATE, ECG03: 64 BPM

## 2018-07-09 ENCOUNTER — HOSPITAL ENCOUNTER (EMERGENCY)
Age: 38
Discharge: HOME | End: 2018-07-09
Attending: EMERGENCY MEDICINE
Payer: SELF-PAY

## 2018-07-09 VITALS
BODY MASS INDEX: 31.67 KG/M2 | DIASTOLIC BLOOD PRESSURE: 90 MMHG | TEMPERATURE: 97.1 F | WEIGHT: 240 LBS | SYSTOLIC BLOOD PRESSURE: 140 MMHG | RESPIRATION RATE: 18 BRPM | OXYGEN SATURATION: 96 % | HEART RATE: 73 BPM

## 2018-07-09 DIAGNOSIS — Z76.0 MEDICATION REFILL: Primary | ICD-10-CM

## 2018-07-09 DIAGNOSIS — L05.01 PILONIDAL ABSCESS: ICD-10-CM

## 2018-07-09 PROCEDURE — 77030019895 HC PCKNG STRP IODO -A

## 2018-07-09 PROCEDURE — 77030018836 HC SOL IRR NACL ICUM -A

## 2018-07-09 PROCEDURE — 99282 EMERGENCY DEPT VISIT SF MDM: CPT

## 2018-07-09 PROCEDURE — 75810000116 HC INC/DRN PILONIDAL CYST SIMPLE

## 2018-07-09 RX ORDER — METHYLPREDNISOLONE 4 MG/1
TABLET ORAL
Qty: 1 DOSE PACK | Refills: 0 | Status: SHIPPED | OUTPATIENT
Start: 2018-07-09

## 2018-07-09 RX ORDER — SULFAMETHOXAZOLE AND TRIMETHOPRIM 800; 160 MG/1; MG/1
2 TABLET ORAL 2 TIMES DAILY
Qty: 40 TAB | Refills: 0 | Status: SHIPPED | OUTPATIENT
Start: 2018-07-09 | End: 2018-07-19

## 2018-07-09 RX ORDER — IBUPROFEN 800 MG/1
800 TABLET ORAL EVERY 8 HOURS
Qty: 15 TAB | Refills: 0 | Status: SHIPPED | OUTPATIENT
Start: 2018-07-09 | End: 2018-07-14

## 2018-07-09 RX ORDER — CEPHALEXIN 500 MG/1
500 CAPSULE ORAL 4 TIMES DAILY
Qty: 40 CAP | Refills: 0 | Status: SHIPPED | OUTPATIENT
Start: 2018-07-09 | End: 2018-07-19

## 2018-07-09 RX ORDER — GABAPENTIN 300 MG/1
300 CAPSULE ORAL 3 TIMES DAILY
Qty: 30 CAP | Refills: 0 | Status: SHIPPED | OUTPATIENT
Start: 2018-07-09 | End: 2018-07-19

## 2018-07-09 NOTE — ED PROVIDER NOTES
EMERGENCY DEPARTMENT HISTORY AND PHYSICAL EXAM    Date: 7/9/2018  Patient Name: Mikie Siu    History of Presenting Illness     Chief Complaint   Patient presents with    Abscess    Medication Refill         History Provided By: Patient    Chief Complaint: medication refill and pilonidal abscess  Duration: 2-3 Days  Timing:  Acute  Location: gluteal cleft  Quality: Aching  Severity: Moderate  Modifying Factors: h/o abscesses  Associated Symptoms: denies any other associated signs or symptoms      Additional History (Context): Mikie Siu is a 42 y.o. male with herniated disk from fall currently undergoing physical therapy with [address] who presents with pilonidal abscess pain x 2-3d. Would like refill on his gabapentin and steroid as well as I&D. Has had abscesses previously. Denies h/o DM. PCP: None    Current Outpatient Prescriptions   Medication Sig Dispense Refill    methylPREDNISolone (MEDROL, PARI,) 4 mg tablet Follow directions on package  Indications: Back Pain 1 Dose Pack 0    trimethoprim-sulfamethoxazole (BACTRIM DS) 160-800 mg per tablet Take 2 Tabs by mouth two (2) times a day for 10 days. 40 Tab 0    cephALEXin (KEFLEX) 500 mg capsule Take 1 Cap by mouth four (4) times daily for 10 days. 40 Cap 0    gabapentin (NEURONTIN) 300 mg capsule Take 1 Cap by mouth three (3) times daily for 10 days. 30 Cap 0    ibuprofen (MOTRIN) 800 mg tablet Take 1 Tab by mouth every eight (8) hours for 5 days. 15 Tab 0    dexamethasone (DECADRON) 2 mg tablet Take one tablet twice daily x 5 days 10 Tab 0    traMADol (ULTRAM) 50 mg tablet Take 1 Tab by mouth every six (6) hours as needed for Pain. Max Daily Amount: 200 mg. 14 Tab 0    HYDROcodone-acetaminophen (NORCO) 5-325 mg per tablet Take 1 Tab by mouth every four (4) hours as needed for Pain. Max Daily Amount: 6 Tabs. 12 Tab 0    naproxen (NAPROSYN) 375 mg tablet Take 1 Tab by mouth two (2) times daily (with meals).  20 Tab 0    cyclobenzaprine (FLEXERIL) 5 mg tablet Take 5 mg by mouth three (3) times daily as needed for Muscle Spasm(s).  acetaminophen-codeine (TYLENOL-CODEINE #4) 300-60 mg per tablet Take 1 Tab by mouth every four (4) hours as needed for Pain. Past History     Past Medical History:  No past medical history on file. Past Surgical History:  No past surgical history on file. Family History:  No family history on file. Social History:  Social History   Substance Use Topics    Smoking status: Current Every Day Smoker    Smokeless tobacco: Never Used    Alcohol use Yes      Comment: social       Allergies:  No Known Allergies      Review of Systems   Review of Systems   Skin: Positive for rash. All other systems reviewed and are negative. All Other Systems Negative  Physical Exam     Vitals:    07/09/18 0208   BP: 140/90   Pulse: 73   Resp: 18   Temp: 97.1 °F (36.2 °C)   SpO2: 96%   Weight: 108.9 kg (240 lb)     Physical Exam   Constitutional: Vital signs are normal. He appears well-developed and well-nourished. He is active. Non-toxic appearance. He does not appear ill. No distress. HENT:   Head: Normocephalic and atraumatic. Neck: Normal range of motion. Neck supple. Carotid bruit is not present. No tracheal deviation present. No thyromegaly present. Cardiovascular: Normal rate, regular rhythm and normal heart sounds. Exam reveals no gallop and no friction rub. No murmur heard. Pulmonary/Chest: Effort normal and breath sounds normal. No stridor. No respiratory distress. He has no wheezes. He has no rales. He exhibits no tenderness. Abdominal: Soft. He exhibits no distension and no mass. There is no tenderness. There is no rebound, no guarding and no CVA tenderness. Musculoskeletal: Normal range of motion. Neurological: He is alert. Skin: Skin is warm, dry and intact. Rash noted. He is not diaphoretic. No pallor.    Fluctuant tenderness at the gluteal cleft, slightly more on left, crossing at midline. Not actively draining. Psychiatric: He has a normal mood and affect. His speech is normal and behavior is normal. Judgment and thought content normal.   Nursing note and vitals reviewed. Diagnostic Study Results     Labs -   No results found for this or any previous visit (from the past 12 hour(s)). Radiologic Studies -   No orders to display     CT Results  (Last 48 hours)    None        CXR Results  (Last 48 hours)    None            Medical Decision Making   I am the first provider for this patient. I reviewed the vital signs, available nursing notes, past medical history, past surgical history, family history and social history. Vital Signs-Reviewed the patient's vital signs. Records Reviewed: Nursing Notes    Procedures:  I&D Abcess Complex  Date/Time: 7/9/2018 2:47 AM  Performed by: Mitesh Fuller  Authorized by: Mitesh Fuller     Consent:     Consent obtained:  Verbal    Consent given by:  Patient    Risks discussed:  Pain    Alternatives discussed:  Referral  Location:     Type:  Abscess    Location:  Anogenital    Anogenital location:  Pilonidal  Pre-procedure details:     Skin preparation:  Betadine  Anesthesia (see MAR for exact dosages): Anesthesia method:  Local infiltration    Local anesthetic:  Lidocaine 1% w/o epi  Procedure type:     Complexity:  Complex  Procedure details:     Needle aspiration: no      Incision types:  Single with marsupialization    Incision depth:  Dermal    Scalpel blade:  11    Wound management:  Probed and deloculated and irrigated with saline    Drainage:  Purulent    Drainage amount: Moderate    Packing materials:  1/4 in iodoform gauze    Amount 1/4\" iodoform:  2  Post-procedure details:     Patient tolerance of procedure: Tolerated well, no immediate complications        Provider Notes (Medical Decision Making): I&D performed. Treat w/bactrim and keflex. Rx refill for herniated disk.   No change in baseline back pain.  Being managed by AO. MED RECONCILIATION:  No current facility-administered medications for this encounter. Current Outpatient Prescriptions   Medication Sig    methylPREDNISolone (MEDROL, PARI,) 4 mg tablet Follow directions on package  Indications: Back Pain    trimethoprim-sulfamethoxazole (BACTRIM DS) 160-800 mg per tablet Take 2 Tabs by mouth two (2) times a day for 10 days.  cephALEXin (KEFLEX) 500 mg capsule Take 1 Cap by mouth four (4) times daily for 10 days.  gabapentin (NEURONTIN) 300 mg capsule Take 1 Cap by mouth three (3) times daily for 10 days.  ibuprofen (MOTRIN) 800 mg tablet Take 1 Tab by mouth every eight (8) hours for 5 days.  dexamethasone (DECADRON) 2 mg tablet Take one tablet twice daily x 5 days    traMADol (ULTRAM) 50 mg tablet Take 1 Tab by mouth every six (6) hours as needed for Pain. Max Daily Amount: 200 mg.    HYDROcodone-acetaminophen (NORCO) 5-325 mg per tablet Take 1 Tab by mouth every four (4) hours as needed for Pain. Max Daily Amount: 6 Tabs.  naproxen (NAPROSYN) 375 mg tablet Take 1 Tab by mouth two (2) times daily (with meals).  cyclobenzaprine (FLEXERIL) 5 mg tablet Take 5 mg by mouth three (3) times daily as needed for Muscle Spasm(s).  acetaminophen-codeine (TYLENOL-CODEINE #4) 300-60 mg per tablet Take 1 Tab by mouth every four (4) hours as needed for Pain. Disposition:  home    DISCHARGE NOTE:   2:47 AM    Pt has been reexamined. Patient has no new complaints, changes, or physical findings. Care plan outlined and precautions discussed. Results of exam were reviewed with the patient. All medications were reviewed with the patient; will d/c home with keflex, bactrim ds, medrol dose pack, ibuprofen, and steroid taper pack. All of pt's questions and concerns were addressed. Patient was instructed and agrees to follow up with AO and PCP, as well as to return to the ED upon further deterioration.  Patient is ready to go home.    Follow-up Information     Follow up With Details Comments Kelsey Lira Schedule an appointment as soon as possible for a visit today  [facility] EMERGENCY DEPT  If symptoms worsen return immediately [address] [facility] [address]  [phone]          Current Discharge Medication List      START taking these medications    Details   trimethoprim-sulfamethoxazole (BACTRIM DS) 160-800 mg per tablet Take 2 Tabs by mouth two (2) times a day for 10 days. Qty: 40 Tab, Refills: 0      cephALEXin (KEFLEX) 500 mg capsule Take 1 Cap by mouth four (4) times daily for 10 days. Qty: 40 Cap, Refills: 0      gabapentin (NEURONTIN) 300 mg capsule Take 1 Cap by mouth three (3) times daily for 10 days. Qty: 30 Cap, Refills: 0      ibuprofen (MOTRIN) 800 mg tablet Take 1 Tab by mouth every eight (8) hours for 5 days. Qty: 15 Tab, Refills: 0         CONTINUE these medications which have CHANGED    Details   methylPREDNISolone (MEDROL, PARI,) 4 mg tablet Follow directions on package  Indications: Back Pain  Qty: 1 Dose Pack, Refills: 0               Diagnosis     Clinical Impression:   1. Medication refill    2.  Pilonidal abscess

## 2018-07-09 NOTE — ED TRIAGE NOTES
Patient states he has a herniated disc,  And only has one gabapentin left.   Pt also states he has a boil at the top of his buttocks

## 2021-06-05 NOTE — DISCHARGE INSTRUCTIONS
Low vitamin D level, sent 2000 unit and advise to start taking twice daily.    Pilonidal Abscess: Care Instructions  Your Care Instructions    A pilonidal abscess is an infection caused by an ingrown hair. The abscess occurs in the area of the tailbone and the top of the buttocks. The infection causes a pocket of pus to form. It can be quite painful. Your doctor may have opened and drained the abscess. You can take care of yourself at home to help the area heal. In some cases, the abscess returns. Your doctor may suggest surgery to remove the site of the infection if it comes back. You may have had a sedative to help you relax. You may be unsteady after having sedation. It can take a few hours for the medicine's effects to wear off. Common side effects of sedation include nausea, vomiting, and feeling sleepy or tired. The doctor has checked you carefully, but problems can develop later. If you notice any problems or new symptoms, get medical treatment right away. Follow-up care is a key part of your treatment and safety. Be sure to make and go to all appointments, and call your doctor if you are having problems. It's also a good idea to know your test results and keep a list of the medicines you take. How can you care for yourself at home? · If the doctor gave you a sedative:  ¨ For 24 hours, don't do anything that requires attention to detail. It takes time for the medicine's effects to completely wear off. ¨ For your safety, do not drive or operate any machinery that could be dangerous. Wait until the medicine wears off and you can think clearly and react easily. · If your doctor prescribed antibiotics, take them exactly as directed. Do not stop taking them just because you feel better. You need to take the full course of antibiotics. · Be safe with medicines. Take pain medicines exactly as directed. ¨ If the doctor gave you a prescription medicine for pain, take it as prescribed.   ¨ If you are not taking a prescription pain medicine, ask your doctor if you can take an over-the-counter medicine. · If your doctor opened and drained your abscess, you may have gauze or other packing material inside your wound. Follow all instructions from your doctor on how to care for your wound. · Keep the area of your wound very clean. Use wet cotton balls, a warm washcloth, or baby wipes. Clean the area gently, especially after a bowel movement. When should you call for help? Call 911 anytime you think you may need emergency care. For example, call if:  ? · You have trouble breathing. ? · You passed out (lost consciousness). ?Call your doctor now or seek immediate medical care if:  ? · You have new or worse nausea or vomiting. ? · You have symptoms of infection, such as:  ¨ Increased pain, swelling, warmth, or redness. ¨ Red streaks leading from the area. ¨ Pus draining from the area. ¨ A fever. ? Watch closely for changes in your health, and be sure to contact your doctor if:  ? · You do not get better as expected. Where can you learn more? Go to http://renan-mayte.info/. Enter [id number] in the search box to learn more about \"Pilonidal Abscess: Care Instructions. \"  Current as of: October 13, 2016  Content Version: 11.4  © 6969-3798 [employer]. Care instructions adapted under license by [employer] (which disclaims liability or warranty for this information). If you have questions about a medical condition or this instruction, always ask your healthcare professional. [place] [address] any warranty or liability for your use of this information.